# Patient Record
Sex: FEMALE | Race: WHITE | URBAN - METROPOLITAN AREA
[De-identification: names, ages, dates, MRNs, and addresses within clinical notes are randomized per-mention and may not be internally consistent; named-entity substitution may affect disease eponyms.]

---

## 2019-08-15 ENCOUNTER — OUTPATIENT (OUTPATIENT)
Dept: OUTPATIENT SERVICES | Facility: HOSPITAL | Age: 69
LOS: 1 days | Discharge: HOME | End: 2019-08-15
Payer: MEDICARE

## 2019-08-15 DIAGNOSIS — Z12.31 ENCOUNTER FOR SCREENING MAMMOGRAM FOR MALIGNANT NEOPLASM OF BREAST: ICD-10-CM

## 2019-08-15 PROCEDURE — 77067 SCR MAMMO BI INCL CAD: CPT | Mod: 26

## 2019-08-15 PROCEDURE — 77063 BREAST TOMOSYNTHESIS BI: CPT | Mod: 26

## 2019-09-17 ENCOUNTER — OUTPATIENT (OUTPATIENT)
Dept: OUTPATIENT SERVICES | Facility: HOSPITAL | Age: 69
LOS: 1 days | Discharge: HOME | End: 2019-09-17

## 2019-09-17 DIAGNOSIS — R97.8 OTHER ABNORMAL TUMOR MARKERS: ICD-10-CM

## 2019-09-17 DIAGNOSIS — E07.9 DISORDER OF THYROID, UNSPECIFIED: ICD-10-CM

## 2019-09-17 DIAGNOSIS — Z79.899 OTHER LONG TERM (CURRENT) DRUG THERAPY: ICD-10-CM

## 2019-09-17 DIAGNOSIS — Z00.00 ENCOUNTER FOR GENERAL ADULT MEDICAL EXAMINATION WITHOUT ABNORMAL FINDINGS: ICD-10-CM

## 2019-09-17 DIAGNOSIS — R73.09 OTHER ABNORMAL GLUCOSE: ICD-10-CM

## 2019-09-17 DIAGNOSIS — R53.83 OTHER FATIGUE: ICD-10-CM

## 2020-07-30 ENCOUNTER — OUTPATIENT (OUTPATIENT)
Dept: OUTPATIENT SERVICES | Facility: HOSPITAL | Age: 70
LOS: 1 days | Discharge: HOME | End: 2020-07-30
Payer: MEDICARE

## 2020-07-30 DIAGNOSIS — R10.819 ABDOMINAL TENDERNESS, UNSPECIFIED SITE: ICD-10-CM

## 2020-07-30 PROCEDURE — 75571 CT HRT W/O DYE W/CA TEST: CPT | Mod: 26,59

## 2020-07-30 PROCEDURE — 74177 CT ABD & PELVIS W/CONTRAST: CPT | Mod: 26

## 2020-07-30 PROCEDURE — 71260 CT THORAX DX C+: CPT | Mod: 26

## 2022-10-23 ENCOUNTER — NON-APPOINTMENT (OUTPATIENT)
Age: 72
End: 2022-10-23

## 2023-05-12 ENCOUNTER — INPATIENT (INPATIENT)
Facility: HOSPITAL | Age: 73
LOS: 1 days | Discharge: HOME | DRG: 506 | End: 2023-05-14
Attending: ORTHOPAEDIC SURGERY | Admitting: ORTHOPAEDIC SURGERY
Payer: MEDICARE

## 2023-05-12 ENCOUNTER — APPOINTMENT (OUTPATIENT)
Dept: ORTHOPEDIC SURGERY | Facility: CLINIC | Age: 73
End: 2023-05-12
Payer: COMMERCIAL

## 2023-05-12 VITALS
HEIGHT: 64 IN | DIASTOLIC BLOOD PRESSURE: 92 MMHG | TEMPERATURE: 98 F | SYSTOLIC BLOOD PRESSURE: 135 MMHG | RESPIRATION RATE: 20 BRPM | OXYGEN SATURATION: 96 % | WEIGHT: 139.99 LBS | HEART RATE: 107 BPM

## 2023-05-12 DIAGNOSIS — M65.9 SYNOVITIS AND TENOSYNOVITIS, UNSPECIFIED: ICD-10-CM

## 2023-05-12 LAB
ANION GAP SERPL CALC-SCNC: 13 MMOL/L — SIGNIFICANT CHANGE UP (ref 7–14)
BASOPHILS # BLD AUTO: 0.02 K/UL — SIGNIFICANT CHANGE UP (ref 0–0.2)
BASOPHILS NFR BLD AUTO: 0.2 % — SIGNIFICANT CHANGE UP (ref 0–1)
BUN SERPL-MCNC: 15 MG/DL — SIGNIFICANT CHANGE UP (ref 10–20)
CALCIUM SERPL-MCNC: 9.4 MG/DL — SIGNIFICANT CHANGE UP (ref 8.4–10.5)
CHLORIDE SERPL-SCNC: 106 MMOL/L — SIGNIFICANT CHANGE UP (ref 98–110)
CO2 SERPL-SCNC: 23 MMOL/L — SIGNIFICANT CHANGE UP (ref 17–32)
CREAT SERPL-MCNC: 0.7 MG/DL — SIGNIFICANT CHANGE UP (ref 0.7–1.5)
EGFR: 91 ML/MIN/1.73M2 — SIGNIFICANT CHANGE UP
EOSINOPHIL # BLD AUTO: 0.01 K/UL — SIGNIFICANT CHANGE UP (ref 0–0.7)
EOSINOPHIL NFR BLD AUTO: 0.1 % — SIGNIFICANT CHANGE UP (ref 0–8)
ERYTHROCYTE [SEDIMENTATION RATE] IN BLOOD: 26 MM/HR — HIGH (ref 0–20)
GLUCOSE SERPL-MCNC: 123 MG/DL — HIGH (ref 70–99)
HCT VFR BLD CALC: 43.1 % — SIGNIFICANT CHANGE UP (ref 37–47)
HGB BLD-MCNC: 14.6 G/DL — SIGNIFICANT CHANGE UP (ref 12–16)
IMM GRANULOCYTES NFR BLD AUTO: 0.2 % — SIGNIFICANT CHANGE UP (ref 0.1–0.3)
LYMPHOCYTES # BLD AUTO: 0.94 K/UL — LOW (ref 1.2–3.4)
LYMPHOCYTES # BLD AUTO: 8.9 % — LOW (ref 20.5–51.1)
MCHC RBC-ENTMCNC: 31 PG — SIGNIFICANT CHANGE UP (ref 27–31)
MCHC RBC-ENTMCNC: 33.9 G/DL — SIGNIFICANT CHANGE UP (ref 32–37)
MCV RBC AUTO: 91.5 FL — SIGNIFICANT CHANGE UP (ref 81–99)
MONOCYTES # BLD AUTO: 0.64 K/UL — HIGH (ref 0.1–0.6)
MONOCYTES NFR BLD AUTO: 6.1 % — SIGNIFICANT CHANGE UP (ref 1.7–9.3)
NEUTROPHILS # BLD AUTO: 8.89 K/UL — HIGH (ref 1.4–6.5)
NEUTROPHILS NFR BLD AUTO: 84.5 % — HIGH (ref 42.2–75.2)
NRBC # BLD: 0 /100 WBCS — SIGNIFICANT CHANGE UP (ref 0–0)
PLATELET # BLD AUTO: 236 K/UL — SIGNIFICANT CHANGE UP (ref 130–400)
PMV BLD: 10.5 FL — HIGH (ref 7.4–10.4)
POTASSIUM SERPL-MCNC: 3.9 MMOL/L — SIGNIFICANT CHANGE UP (ref 3.5–5)
POTASSIUM SERPL-SCNC: 3.9 MMOL/L — SIGNIFICANT CHANGE UP (ref 3.5–5)
RBC # BLD: 4.71 M/UL — SIGNIFICANT CHANGE UP (ref 4.2–5.4)
RBC # FLD: 12.2 % — SIGNIFICANT CHANGE UP (ref 11.5–14.5)
SODIUM SERPL-SCNC: 142 MMOL/L — SIGNIFICANT CHANGE UP (ref 135–146)
WBC # BLD: 10.52 K/UL — SIGNIFICANT CHANGE UP (ref 4.8–10.8)
WBC # FLD AUTO: 10.52 K/UL — SIGNIFICANT CHANGE UP (ref 4.8–10.8)

## 2023-05-12 PROCEDURE — 73220 MRI UPPR EXTREMITY W/O&W/DYE: CPT | Mod: 26,RT

## 2023-05-12 PROCEDURE — 99204 OFFICE O/P NEW MOD 45 MIN: CPT

## 2023-05-12 PROCEDURE — 99285 EMERGENCY DEPT VISIT HI MDM: CPT | Mod: GC

## 2023-05-12 PROCEDURE — 85027 COMPLETE CBC AUTOMATED: CPT

## 2023-05-12 PROCEDURE — 86803 HEPATITIS C AB TEST: CPT

## 2023-05-12 PROCEDURE — 87205 SMEAR GRAM STAIN: CPT

## 2023-05-12 PROCEDURE — 87070 CULTURE OTHR SPECIMN AEROBIC: CPT

## 2023-05-12 PROCEDURE — 36415 COLL VENOUS BLD VENIPUNCTURE: CPT

## 2023-05-12 PROCEDURE — 85652 RBC SED RATE AUTOMATED: CPT

## 2023-05-12 PROCEDURE — 73220 MRI UPPR EXTREMITY W/O&W/DYE: CPT | Mod: MA,RT

## 2023-05-12 PROCEDURE — 86140 C-REACTIVE PROTEIN: CPT

## 2023-05-12 PROCEDURE — 73130 X-RAY EXAM OF HAND: CPT | Mod: 26,50

## 2023-05-12 PROCEDURE — A9579: CPT

## 2023-05-12 RX ORDER — KETOROLAC TROMETHAMINE 30 MG/ML
15 SYRINGE (ML) INJECTION EVERY 6 HOURS
Refills: 0 | Status: DISCONTINUED | OUTPATIENT
Start: 2023-05-12 | End: 2023-05-13

## 2023-05-12 RX ORDER — IBUPROFEN 200 MG
600 TABLET ORAL ONCE
Refills: 0 | Status: COMPLETED | OUTPATIENT
Start: 2023-05-12 | End: 2023-05-12

## 2023-05-12 RX ORDER — AMPICILLIN SODIUM AND SULBACTAM SODIUM 250; 125 MG/ML; MG/ML
1.5 INJECTION, POWDER, FOR SUSPENSION INTRAMUSCULAR; INTRAVENOUS ONCE
Refills: 0 | Status: COMPLETED | OUTPATIENT
Start: 2023-05-12 | End: 2023-05-12

## 2023-05-12 RX ORDER — POLYETHYLENE GLYCOL 3350 17 G/17G
17 POWDER, FOR SOLUTION ORAL AT BEDTIME
Refills: 0 | Status: DISCONTINUED | OUTPATIENT
Start: 2023-05-12 | End: 2023-05-14

## 2023-05-12 RX ORDER — TETANUS TOXOID, REDUCED DIPHTHERIA TOXOID AND ACELLULAR PERTUSSIS VACCINE, ADSORBED 5; 2.5; 8; 8; 2.5 [IU]/.5ML; [IU]/.5ML; UG/.5ML; UG/.5ML; UG/.5ML
0.5 SUSPENSION INTRAMUSCULAR ONCE
Refills: 0 | Status: COMPLETED | OUTPATIENT
Start: 2023-05-12 | End: 2023-05-12

## 2023-05-12 RX ORDER — MAGNESIUM HYDROXIDE 400 MG/1
30 TABLET, CHEWABLE ORAL DAILY
Refills: 0 | Status: DISCONTINUED | OUTPATIENT
Start: 2023-05-12 | End: 2023-05-14

## 2023-05-12 RX ORDER — PANTOPRAZOLE SODIUM 20 MG/1
40 TABLET, DELAYED RELEASE ORAL
Refills: 0 | Status: DISCONTINUED | OUTPATIENT
Start: 2023-05-12 | End: 2023-05-14

## 2023-05-12 RX ORDER — SENNA PLUS 8.6 MG/1
2 TABLET ORAL AT BEDTIME
Refills: 0 | Status: DISCONTINUED | OUTPATIENT
Start: 2023-05-12 | End: 2023-05-14

## 2023-05-12 RX ORDER — ONDANSETRON 8 MG/1
4 TABLET, FILM COATED ORAL EVERY 6 HOURS
Refills: 0 | Status: DISCONTINUED | OUTPATIENT
Start: 2023-05-12 | End: 2023-05-14

## 2023-05-12 RX ORDER — GABAPENTIN 400 MG/1
300 CAPSULE ORAL
Refills: 0 | Status: DISCONTINUED | OUTPATIENT
Start: 2023-05-12 | End: 2023-05-14

## 2023-05-12 RX ORDER — TRAMADOL HYDROCHLORIDE 50 MG/1
50 TABLET ORAL EVERY 4 HOURS
Refills: 0 | Status: DISCONTINUED | OUTPATIENT
Start: 2023-05-12 | End: 2023-05-14

## 2023-05-12 RX ORDER — AMPICILLIN SODIUM AND SULBACTAM SODIUM 250; 125 MG/ML; MG/ML
3 INJECTION, POWDER, FOR SUSPENSION INTRAMUSCULAR; INTRAVENOUS EVERY 6 HOURS
Refills: 0 | Status: DISCONTINUED | OUTPATIENT
Start: 2023-05-12 | End: 2023-05-14

## 2023-05-12 RX ADMIN — Medication 600 MILLIGRAM(S): at 12:39

## 2023-05-12 RX ADMIN — Medication 15 MILLIGRAM(S): at 17:28

## 2023-05-12 RX ADMIN — AMPICILLIN SODIUM AND SULBACTAM SODIUM 200 GRAM(S): 250; 125 INJECTION, POWDER, FOR SUSPENSION INTRAMUSCULAR; INTRAVENOUS at 17:28

## 2023-05-12 RX ADMIN — Medication 15 MILLIGRAM(S): at 17:38

## 2023-05-12 RX ADMIN — TETANUS TOXOID, REDUCED DIPHTHERIA TOXOID AND ACELLULAR PERTUSSIS VACCINE, ADSORBED 0.5 MILLILITER(S): 5; 2.5; 8; 8; 2.5 SUSPENSION INTRAMUSCULAR at 12:40

## 2023-05-12 RX ADMIN — AMPICILLIN SODIUM AND SULBACTAM SODIUM 200 GRAM(S): 250; 125 INJECTION, POWDER, FOR SUSPENSION INTRAMUSCULAR; INTRAVENOUS at 23:05

## 2023-05-12 RX ADMIN — AMPICILLIN SODIUM AND SULBACTAM SODIUM 200 GRAM(S): 250; 125 INJECTION, POWDER, FOR SUSPENSION INTRAMUSCULAR; INTRAVENOUS at 13:12

## 2023-05-12 RX ADMIN — Medication 15 MILLIGRAM(S): at 23:30

## 2023-05-12 RX ADMIN — Medication 15 MILLIGRAM(S): at 23:05

## 2023-05-12 RX ADMIN — GABAPENTIN 300 MILLIGRAM(S): 400 CAPSULE ORAL at 21:27

## 2023-05-12 NOTE — ED PROVIDER NOTE - ATTENDING CONTRIBUTION TO CARE
To sedate her because nurses do not feel comfortable getting the labs  72-year-old female past medical history of osteoarthritis hyperlipidemia presents status post cat bite.  Patient states her cat who is vaccinated was trying to go outside and she brought the cat back in when it bit and scratched both of her hands 2 days ago.  Patient developed redness swelling pain especially to right fourth digit PIP.  Patient took 1 dose of Augmentin with no relief today.  Patient seen by orthopedic hand surgeon Dr. Tse today and sent to ER.  Unknown last Tdap.  Patient also states her wedding band on left fourth digit is stuck.    On exam, AFVSS, Well appearing, No acute distress, NCAT, EOMI, PERRLA, MMM, Neck supple, LCTAB, RRR nl s1s2 No mrg, Abdomen Soft NTND, AAOx3, No Focal Deficits, No LE edema or calf TTP, right fourth digit erythematous swollen sausagelike tender to palpation especially at DIP with decreased range of motion held in flexion, pain with passive extension, sensation intact light touch, multiple scratches to bilateral hands, erythema on the palmar aspect of the right fourth PIP, 2+ radial pulse,    A/P; wedding band removed from left fourth digit, right fourth digit concern for wound infection/cellulitis, rule out flexor tenosynovitis/septic arthritis/abscess, will do labs x-ray Tdap IV antibiotics hand surgery consult needs admission

## 2023-05-12 NOTE — ED PROVIDER NOTE - PROGRESS NOTE DETAILS
Resident AO: Wedding band set removed, and handed to patient. X-ray, labs, antibiotics/tetanus ordered. Hand Surgery consulted.

## 2023-05-12 NOTE — H&P ADULT - ASSESSMENT
owner catscratch ad bite 2 days pta   r/o right rf fts with deep space infection   unasyn  mri   betadine soaks   possible I&D

## 2023-05-12 NOTE — ED ADULT NURSE NOTE - NSFALLUNIVINTERV_ED_ALL_ED
Bed/Stretcher in lowest position, wheels locked, appropriate side rails in place/Call bell, personal items and telephone in reach/Instruct patient to call for assistance before getting out of bed/chair/stretcher/Non-slip footwear applied when patient is off stretcher/[place] to call system/Physically safe environment - no spills, clutter or unnecessary equipment/Purposeful proactive rounding/Room/bathroom lighting operational, light cord in reach

## 2023-05-12 NOTE — CONSULT NOTE ADULT - SUBJECTIVE AND OBJECTIVE BOX
Patient is a 72-year-old woman with a history of arthritis, dyslipidemia, presenting status post animal bites.  Patient was handling her own kitten [who is fully vaccinated (including for rabies), and is indoors only] 2 days ago, when it bit and scratched both of her hands.  Patient then slowly developed worsening swelling of both of her hands, specifically fourth digit of the right hand.  Patient took 1 dose of Augmentin yesterday evening, and was advised to come to the ED today.  No fever.  The right fourth digit is erythematous and swollen, and held in slight flexion, unable to fully flex or extend secondary to pain.   Dr Tse saw  the pt in the office today and recommended the pt be admmitted for IVabx , soaks and an MRI w&w/o contrast to evaluate for deep space infection and fts  the ID team has been requested to see the pt for abx recommendations.     Patient is a 72-year-old woman with a history of arthritis, dyslipidemia, presenting status post animal bites.  Patient was handling her own kitten [who is fully vaccinated (including for rabies), and is indoors only] 2 days ago, when it bit and scratched both of her hands.  Patient then slowly developed worsening swelling of both of her hands, specifically fourth digit of the right hand.  Patient took 1 dose of Augmentin yesterday evening, and was advised to come to the ED today.  No fever.  The right RING FINGER  is erythematous and swollen, and held in slight flexion, unable to fully flex or extend secondary to pain.   Dr Tse saw  the pt in the office today and recommended the pt be admmitted for IVabx , soaks and an MRI w&w/o contrast to evaluate for deep space infection and fts  the ID team has been requested to see the pt for abx recommendations.     Patient is a 72-year-old woman with a history of arthritis, dyslipidemia, presenting status post animal bites.  Patient was handling her own kitten [who is fully vaccinated (including for rabies), and is indoors only] 2 days ago, when it bit and scratched both of her hands.  Patient then slowly developed worsening swelling of both of her hands, specifically fourth digit of the right hand.  Patient took 1 dose of Augmentin yesterday evening, and was advised to come to the ED today.  No fever.  The right RING FINGER  is erythematous and swollen, and held in slight flexion, unable to fully flex or extend secondary to pain.   Dr Tse saw  the pt in the office today and recommended the pt be admmitted for IVabx , soaks and an MRI w&w/o contrast to evaluate for deep space infection and fts  left hand wedding ring removed mild tender good active rom   right hand ring finger fusiform sts, tender over the flexor tendon , decreased rom   mild snow proximal tracking     a/p   r/o   left rf fts   r/o deep space infection    Unasyn 3g q6h IV , warm soaks encourage rom while in the betadine soaks 5x day   the ID team RECOMMENDATIONS  - MRI  - Hand surgery  - Unasyn 3g q6h IV , warm soaks encourage rom while in the betadine soaks  final regimen pending MRI results   - ESR, CRP

## 2023-05-12 NOTE — CONSULT NOTE ADULT - SUBJECTIVE AND OBJECTIVE BOX
BENTLEY PACE  72y, Female  Allergy: Allergy Status Unknown      CHIEF COMPLAINT:       LOS      HPI  73 yo F presenting after cat bite. It was her indoor vaccinated cat that tried to run outside. She reports multiple bites/scratches. Started PO augmentin last night.     Currently ordered for: s/p unasyn x1       PMH  PAST MEDICAL & SURGICAL HISTORY:  as noted above     FAMILY HISTORY  non-contributory     SOCIAL HISTORY  Social History:      ROS  General: Denies rigors, nightsweats  HEENT: Denies headache, rhinorrhea, sore throat, eye pain  CV: Denies CP, palpitations  PULM: Denies wheezing, hemoptysis  GI: Denies hematemesis, hematochezia, melena  : Denies discharge, hematuria  MSK: Denies arthralgias, myalgias  SKIN: as noted above   NEURO: Denies paresthesias, weakness  PSYCH: Denies depression, anxiety     VITALS:  T(F): 98.2, Max: 98.2 (05-12-23 @ 11:13)  HR: 107  BP: 135/92  RR: 20Vital Signs Last 24 Hrs  T(C): 36.8 (12 May 2023 11:13), Max: 36.8 (12 May 2023 11:13)  T(F): 98.2 (12 May 2023 11:13), Max: 98.2 (12 May 2023 11:13)  HR: 107 (12 May 2023 11:13) (107 - 107)  BP: 135/92 (12 May 2023 11:13) (135/92 - 135/92)  BP(mean): --  RR: 20 (12 May 2023 11:13) (20 - 20)  SpO2: 96% (12 May 2023 11:13) (96% - 96%)    Parameters below as of 12 May 2023 11:13  Patient On (Oxygen Delivery Method): room air        PHYSICAL EXAM:  Gen: NAD, resting in bed  HEENT: Normocephalic, atraumatic  Neck: supple, no lymphadenopathy  CV: Regular rate & regular rhythm  Lungs: decreased BS at bases, no fremitus  Abdomen: Soft, BS present  Ext: Warm, well perfused  Neuro: non focal, awake  Skin: L hand 4th digit edema, R hand edema, decreased ROM, cannot make a full fist, bite marks- multiple, edema of the R 4th finger, held in slight flexion, erythema, no lymphangitis   Lines: no phlebitis     TESTS & MEASUREMENTS:                        14.6   10.52 )-----------( 236      ( 12 May 2023 12:49 )             43.1     05-12    142  |  106  |  15  ----------------------------<  123<H>  3.9   |  23  |  0.7    Ca    9.4      12 May 2023 12:49                    INFECTIOUS DISEASES TESTING      INFLAMMATORY MARKERS      RADIOLOGY & ADDITIONAL TESTS:  I have personally reviewed the last Chest xray  CXR      CT      CARDIOLOGY TESTING      MEDICATIONS        ANTIBIOTICS:      ALLERGIES:  Allergy Status Unknown

## 2023-05-12 NOTE — ED PROVIDER NOTE - PHYSICAL EXAMINATION
Received cosign on ambulance request.  CM putting in call for authorization from [facility], [phone].  Awaiting call back.    1140 am Received call back from [facility], Authorization number is [id number].  Will put in for ambulance through [facility].  Request from floor for ambulance to notify nurse when ambulance enroute.  Nursing station number given.   _  CONSTITUTIONAL: NAD  SKIN: Warm, dry; +abrasions and puncture wounds to bilateral hands  HEAD: NCAT  EYES: Clear conjunctiva   ENT: MMM  NECK: Supple  CARD: No JVD, no cyanosis  RESP: Speaking in full sentences; symmetric rise and fall of chest  ABD: S/NT, no R/G  NEUROMSK: +RUE with erythema and swelling of the dorsum of hand and 4th digit, said digit held in mild flexion with difficulty fully extending/flexing; +LUE with mildly swollen 4th digit with wedding band set in place; B/L radial pulse 2+; motor and sensation intact B/L  PSYCH: Cooperative, appropriate

## 2023-05-12 NOTE — ED PROVIDER NOTE - OBJECTIVE STATEMENT
Patient is a 72-year-old woman with a history of arthritis, dyslipidemia, presenting status post animal bites.  Patient was handling her own kitten [who is fully vaccinated (including for rabies), and is indoors only] 2 days ago, when it bit and scratched both of her hands.  Patient then slowly developed worsening swelling of both of her hands, specifically fourth digit of the right hand.  Patient took 1 dose of Augmentin yesterday evening, and was advised to come to the ED today.  No fever.  The right fourth digit is erythematous and swollen, and held in slight flexion, unable to fully flex or extend secondary to pain. Patient has her wedding band side on the fourth digit of the left hand, which is slightly swollen.  Unsure of last tetanus vaccination.

## 2023-05-12 NOTE — H&P ADULT - HISTORY OF PRESENT ILLNESS
73 yo female incurred cat scratches' and bites to both hands 2 days ago the right rf is  suspicious for fts oral augmenten was started as an out pt for a dose before the pt arrived to the ed   pmhx  dld   mild djd  pshx   3csections   allergies denied   illicit substances denied   tobacco denied   Vital Signs Last 24 Hrs  T(C): 36.8 (12 May 2023 11:13), Max: 36.8 (12 May 2023 11:13)  T(F): 98.2 (12 May 2023 11:13), Max: 98.2 (12 May 2023 11:13)  HR: 107 (12 May 2023 11:13) (107 - 107)  BP: 135/92 (12 May 2023 11:13) (135/92 - 135/92)  BP(mean): --  RR: 20 (12 May 2023 11:13) (20 - 20)  SpO2: 96% (12 May 2023 11:13) (96% - 96%)    Parameters below as of 12 May 2023 11:13  Patient On (Oxygen Delivery Method): room air                        14.6   10.52 )-----------( 236      ( 12 May 2023 12:49 )             43.1     05-12    142  |  106  |  15  ----------------------------<  123<H>  3.9   |  23  |  0.7    Ca    9.4      12 May 2023 12:49    physical the pts left hand has a few scratches lrf mild sts with good arom  right hand a few scratches with fusiform sts of the rf with ascending cellulitis, decreased arom  tenderness over the ft

## 2023-05-12 NOTE — ED ADULT NURSE REASSESSMENT NOTE - NS ED NURSE REASSESS COMMENT FT1
Patient transported to MRI and then to floor . Patient provided a gown to be changed in the changing room in MRI ,bathroom was not available now

## 2023-05-12 NOTE — CONSULT NOTE ADULT - ASSESSMENT
ASSESSMENT  73 yo F presenting after cat bite. It was her indoor vaccinated cat that tried to run outside. She reports multiple bites/scratches. Started PO augmentin last night.     IMPRESSION  #Cat bite, infected, rule out tenosynovitis  < from: Xray Hand 3 Views, Bilateral (05.12.23 @ 12:34) >  No acute displaced fracture dislocation.  Degenerative change, right greater than left.  Osteopenia  #Sepsis ruled out on admission   #Abx allergy: Allergy Status Unknown    Creatinine, Serum: 0.7 (05-12-23 @ 12:49)    Weight (kg): 63.5 (05-12-23 @ 11:13)    RECOMMENDATIONS  - MRI  - Hand surgery  - Unasyn 3g q6h IV , final regimen pending MRI results   - ESR, CRP     If any questions, please send a message or call on [facility] Teams  Please continue to update ID with any pertinent new laboratory or radiographic findings  #[id number]

## 2023-05-12 NOTE — ED PROCEDURE NOTE - CPROC ED PATIENT POSITION1
sitting PMI and heart sounds localize heart on left side of chest/Murmurs absent/Pulse with normal variation, frequency and intensity (amplitude & strength) with equal intensity on upper and lower extremities/Blood pressure value(s) are adequate

## 2023-05-12 NOTE — ASSESSMENT
[FreeTextEntry1] : Patient comes in with a couple days old of a cat bite on the right side.  She started Augmentin yesterday.  She says the pain has been getting worse.  She has been working on range of motion.  The cat was hers.  She has pain of the ring finger.\par \par Right hand: Multiple cat bites throughout the hand, notably on the ulnar aspect of the ring finger dorsal aspect of the ring finger MP joint dorsal aspect of the hand and wrist, erythema along the volar aspect of the ring finger, dorsal aspect of the hands, decreased range of motion of the fingers especially the ring finger, decreased range of motion of the MP joint with mild pain, no pain with range of motion of MP joint, mildly tender palpation along flexor tendon sheath\par \par Patient had a cat bite to the right hand.  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  She never can fully straighten it.  I discussed with the patient that I believe she should have IV antibiotics.  She was sent to the emergency department to get IV Unasyn.  She will then restart her Augmentin and also start Bactrim when she leaves the ED.  Her erythema was marked on the hand in hopes that it improves.  If the pain does not improve there is a chance we may need to consider surgical intervention.

## 2023-05-13 LAB
CRP SERPL-MCNC: 35.4 MG/L — HIGH
ERYTHROCYTE [SEDIMENTATION RATE] IN BLOOD: 22 MM/HR — HIGH (ref 0–20)
HCT VFR BLD CALC: 38 % — SIGNIFICANT CHANGE UP (ref 37–47)
HCV AB S/CO SERPL IA: 0.03 COI — SIGNIFICANT CHANGE UP
HCV AB SERPL-IMP: SIGNIFICANT CHANGE UP
HGB BLD-MCNC: 12.6 G/DL — SIGNIFICANT CHANGE UP (ref 12–16)
MCHC RBC-ENTMCNC: 31.1 PG — HIGH (ref 27–31)
MCHC RBC-ENTMCNC: 33.2 G/DL — SIGNIFICANT CHANGE UP (ref 32–37)
MCV RBC AUTO: 93.8 FL — SIGNIFICANT CHANGE UP (ref 81–99)
NRBC # BLD: 0 /100 WBCS — SIGNIFICANT CHANGE UP (ref 0–0)
PLATELET # BLD AUTO: 186 K/UL — SIGNIFICANT CHANGE UP (ref 130–400)
PMV BLD: 10.7 FL — HIGH (ref 7.4–10.4)
RBC # BLD: 4.05 M/UL — LOW (ref 4.2–5.4)
RBC # FLD: 12.4 % — SIGNIFICANT CHANGE UP (ref 11.5–14.5)
WBC # BLD: 8.99 K/UL — SIGNIFICANT CHANGE UP (ref 4.8–10.8)
WBC # FLD AUTO: 8.99 K/UL — SIGNIFICANT CHANGE UP (ref 4.8–10.8)

## 2023-05-13 PROCEDURE — 26010 DRAINAGE OF FINGER ABSCESS: CPT

## 2023-05-13 PROCEDURE — 99231 SBSQ HOSP IP/OBS SF/LOW 25: CPT | Mod: 25,GC

## 2023-05-13 RX ORDER — LIDOCAINE HCL 20 MG/ML
20 VIAL (ML) INJECTION ONCE
Refills: 0 | Status: DISCONTINUED | OUTPATIENT
Start: 2023-05-13 | End: 2023-05-14

## 2023-05-13 RX ADMIN — Medication 15 MILLIGRAM(S): at 05:24

## 2023-05-13 RX ADMIN — AMPICILLIN SODIUM AND SULBACTAM SODIUM 200 GRAM(S): 250; 125 INJECTION, POWDER, FOR SUSPENSION INTRAMUSCULAR; INTRAVENOUS at 11:53

## 2023-05-13 RX ADMIN — GABAPENTIN 300 MILLIGRAM(S): 400 CAPSULE ORAL at 17:34

## 2023-05-13 RX ADMIN — AMPICILLIN SODIUM AND SULBACTAM SODIUM 200 GRAM(S): 250; 125 INJECTION, POWDER, FOR SUSPENSION INTRAMUSCULAR; INTRAVENOUS at 23:31

## 2023-05-13 RX ADMIN — AMPICILLIN SODIUM AND SULBACTAM SODIUM 200 GRAM(S): 250; 125 INJECTION, POWDER, FOR SUSPENSION INTRAMUSCULAR; INTRAVENOUS at 05:23

## 2023-05-13 RX ADMIN — Medication 15 MILLIGRAM(S): at 11:54

## 2023-05-13 RX ADMIN — Medication 15 MILLIGRAM(S): at 05:52

## 2023-05-13 RX ADMIN — GABAPENTIN 300 MILLIGRAM(S): 400 CAPSULE ORAL at 05:23

## 2023-05-13 RX ADMIN — Medication 1 TABLET(S): at 11:54

## 2023-05-13 RX ADMIN — PANTOPRAZOLE SODIUM 40 MILLIGRAM(S): 20 TABLET, DELAYED RELEASE ORAL at 05:23

## 2023-05-13 RX ADMIN — AMPICILLIN SODIUM AND SULBACTAM SODIUM 200 GRAM(S): 250; 125 INJECTION, POWDER, FOR SUSPENSION INTRAMUSCULAR; INTRAVENOUS at 17:34

## 2023-05-13 NOTE — PROGRESS NOTE ADULT - TIME-BASED BILLING (NON-CRITICAL CARE)
Rohan Ventura is requesting a refill of:    Pending Prescriptions:                       Disp   Refills    simvastatin (ZOCOR) 20 MG tablet          90 tab*1            Sig: Take 1 tablet (20 mg) by mouth At Bedtime    Recent Labs   Lab Test  02/13/17   1842  02/08/16   1636   CHOL  151  178   HDL  34*  35*   LDL  92  113   TRIG  127  151*   CHOLHDLRATIO  4.4  5.1*     Please close encounter if RX was sent. Thanks, Skylar    
Time-based billing (NON-critical care)

## 2023-05-13 NOTE — PROGRESS NOTE ADULT - ASSESSMENT
73 yo F presenting after cat bite. It was her indoor vaccinated cat that tried to run outside. She reports multiple bites/scratches. Started PO augmentin last night.     IMPRESSION  #Cat bite, infected, rule out tenosynovitis  < from: Xray Hand 3 Views, Bilateral (05.12.23 @ 12:34) >  No acute displaced fracture dislocation.  MRI with 10 x 5 x 4 mm subcutaneous abscess dorsal to the ring finger PIP joint. Effusion and synovitis of the PIP joint which raises concern for septic   arthritis. Mild tenosynovitis in the ring finger. No evidence of osteomyelitis.  ESR 22, CRP 35.4  Degenerative change, right greater than left.  Osteopenia  #Sepsis ruled out on admission   #Abx allergy: Allergy Status Unknown    Creatinine, Serum: 0.7 (05-12-23 @ 12:49)    Weight (kg): 63.5 (05-12-23 @ 11:13)    RECOMMENDATIONS  - Hand surgery  - Unasyn 3g q6h IV.  Need C&S if any drainage attempted

## 2023-05-13 NOTE — PROGRESS NOTE ADULT - ATTENDING COMMENTS
seen and examined  fluctuance over dorsal pip joint  I and D performed bedside  under sterile conditions, field block performed, 1c longitudinal incision made over pip jt, bluntly dissected, expressed 0.5cc pus, packed with iodiform  cultures sent  tolerated well

## 2023-05-13 NOTE — PROGRESS NOTE ADULT - SUBJECTIVE AND OBJECTIVE BOX
SUBJECTIVE: Patient seen and examined. Pain controlled.  Pt did well o/n  No f/c/n/v/cp/sob. RF improving since admission.      OBJECTIVE:  NAD  Vital Signs Last 24 Hrs  T(C): 37.3 (13 May 2023 04:30), Max: 37.3 (13 May 2023 00:34)  T(F): 99.2 (13 May 2023 04:30), Max: 99.2 (13 May 2023 04:30)  HR: 76 (13 May 2023 04:30) (76 - 107)  BP: 129/60 (13 May 2023 04:30) (118/58 - 147/67)  BP(mean): --  RR: 18 (13 May 2023 04:30) (18 - 20)  SpO2: 97% (13 May 2023 04:30) (95% - 97%)    Parameters below as of 13 May 2023 04:30  Patient On (Oxygen Delivery Method): room air        Affected extremity:   RUE  RF swollen and red  Wound on dorsal PIP  No pain along tend sheath  No fusiform swelling  No pain with passive extension  Compartments soft and compressible, no pain w/ passive stretch of digits  SILT Ax/LABCN/R/M/U  AIN/PIN/U intact   Firing deltoid/biceps/triceps/wf/we/epl/fpl/fdp/io   Radial pulse 2+, cap refill <2                          14.6   10.52 )-----------( 236      ( 12 May 2023 12:49 )             43.1     05-12    142  |  106  |  15  ----------------------------<  123<H>  3.9   |  23  |  0.7    Ca    9.4      12 May 2023 12:49      A/P :  Pt is a 71yo Female with right RF cat scratch 3 days ago, admitted for abx and monitoring.  Low concern for FTS at this time.      -    Pain control  -    C/W betadine soaks   -    C/W abx  -    Ortho to monitor
BENTLEY PACE  72y, Female  Allergy: Allergy Status Unknown      CHIEF COMPLAINT: cat bite right hand infection (13 May 2023 07:31)      INTERVAL EVENTS/HPI  - No acute events overnight  - T(F): , Max: 99.2 (05-13-23 @ 04:30)  - Denies any worsening symptoms  - Tolerating medication  - WBC Count: 8.99 K/uL (05-13-23 @ 06:05)      ROS  General: Denies fevers, chills, nightsweats, weight loss  HEENT: Denies headache, rhinorrhea, sore throat, eye pain  CV: Denies CP, palpitations  PULM: Denies SOB, cough  GI: Denies abdominal pain, diarrhea  : Denies dysuria, hematuria  MSK: Denies arthralgias  SKIN: Denies rash   NEURO: Denies paresthesias, weakness  PSYCH: Denies depression    FH non-contributory   Social Hx non-contributory    VITALS:  T(F): 97.6, Max: 99.2 (05-13-23 @ 04:30)  HR: 73  BP: 123/58  RR: 18Vital Signs Last 24 Hrs  T(C): 36.4 (13 May 2023 09:15), Max: 37.3 (13 May 2023 00:34)  T(F): 97.6 (13 May 2023 09:15), Max: 99.2 (13 May 2023 04:30)  HR: 73 (13 May 2023 09:15) (73 - 86)  BP: 123/58 (13 May 2023 09:15) (118/58 - 147/67)  BP(mean): --  RR: 18 (13 May 2023 09:15) (18 - 18)  SpO2: 96% (13 May 2023 09:15) (95% - 97%)    Parameters below as of 13 May 2023 04:30  Patient On (Oxygen Delivery Method): room air        PHYSICAL EXAM:  Gen: NAD, resting in bed  HEENT: Normocephalic, atraumatic  Neck: supple, no lymphadenopathy  CV: Regular rate & regular rhythm  Lungs: decreased BS at bases, no fremitus  Abdomen: Soft, BS present  Ext: Warm, well perfused  Neuro: non focal, awake  Skin: L hand 4th digit edema, R hand edema, decreased ROM, cannot make a full fist, bite marks- multiple, edema of the R 4th finger, held in slight flexion, erythema      TESTS & MEASUREMENTS:                        12.6   8.99  )-----------( 186      ( 13 May 2023 06:05 )             38.0     05-12    142  |  106  |  15  ----------------------------<  123<H>  3.9   |  23  |  0.7    Ca    9.4      12 May 2023 12:49                    INFECTIOUS DISEASES TESTING      RADIOLOGY & ADDITIONAL TESTS:  I have personally reviewed the last Chest xray  CXR      CT      CARDIOLOGY TESTING      MEDICATIONS  ampicillin/sulbactam  IVPB 3  gabapentin 300  ketorolac   Injectable 15  multivitamin 1  pantoprazole    Tablet 40  polyethylene glycol 3350 17  senna 2      ANTIBIOTICS:  ampicillin/sulbactam  IVPB 3 Gram(s) IV Intermittent every 6 hours      All available historical data has been reviewed

## 2023-05-14 ENCOUNTER — TRANSCRIPTION ENCOUNTER (OUTPATIENT)
Age: 73
End: 2023-05-14

## 2023-05-14 VITALS
TEMPERATURE: 97 F | DIASTOLIC BLOOD PRESSURE: 65 MMHG | OXYGEN SATURATION: 96 % | HEART RATE: 78 BPM | RESPIRATION RATE: 18 BRPM | SYSTOLIC BLOOD PRESSURE: 141 MMHG

## 2023-05-14 RX ORDER — AMPICILLIN SODIUM AND SULBACTAM SODIUM 250; 125 MG/ML; MG/ML
3 INJECTION, POWDER, FOR SUSPENSION INTRAMUSCULAR; INTRAVENOUS ONCE
Refills: 0 | Status: COMPLETED | OUTPATIENT
Start: 2023-05-14 | End: 2023-05-14

## 2023-05-14 RX ORDER — AMPICILLIN SODIUM AND SULBACTAM SODIUM 250; 125 MG/ML; MG/ML
3 INJECTION, POWDER, FOR SUSPENSION INTRAMUSCULAR; INTRAVENOUS EVERY 6 HOURS
Refills: 0 | Status: DISCONTINUED | OUTPATIENT
Start: 2023-05-14 | End: 2023-05-14

## 2023-05-14 RX ORDER — AMPICILLIN SODIUM AND SULBACTAM SODIUM 250; 125 MG/ML; MG/ML
INJECTION, POWDER, FOR SUSPENSION INTRAMUSCULAR; INTRAVENOUS
Refills: 0 | Status: DISCONTINUED | OUTPATIENT
Start: 2023-05-14 | End: 2023-05-14

## 2023-05-14 RX ADMIN — AMPICILLIN SODIUM AND SULBACTAM SODIUM 200 GRAM(S): 250; 125 INJECTION, POWDER, FOR SUSPENSION INTRAMUSCULAR; INTRAVENOUS at 05:51

## 2023-05-14 RX ADMIN — PANTOPRAZOLE SODIUM 40 MILLIGRAM(S): 20 TABLET, DELAYED RELEASE ORAL at 05:54

## 2023-05-14 RX ADMIN — GABAPENTIN 300 MILLIGRAM(S): 400 CAPSULE ORAL at 05:50

## 2023-05-14 RX ADMIN — AMPICILLIN SODIUM AND SULBACTAM SODIUM 200 GRAM(S): 250; 125 INJECTION, POWDER, FOR SUSPENSION INTRAMUSCULAR; INTRAVENOUS at 10:48

## 2023-05-14 NOTE — DISCHARGE NOTE PROVIDER - CARE PROVIDER_API CALL
Karyn Tse)  Orthopaedic Surgery; Surgery of the Hand  [address] [place] [address]  Phone: [phone]  Fax: [phone]  Established Patient  Follow Up Time:

## 2023-05-14 NOTE — DISCHARGE NOTE PROVIDER - NSDCCPCAREPLAN_GEN_ALL_CORE_FT
PRINCIPAL DISCHARGE DIAGNOSIS  Diagnosis: Tenosynovitis  Assessment and Plan of Treatment:       SECONDARY DISCHARGE DIAGNOSES  Diagnosis: Cat bite  Assessment and Plan of Treatment:

## 2023-05-14 NOTE — DISCHARGE NOTE NURSING/CASE MANAGEMENT/SOCIAL WORK - NSDCPEFALRISK_GEN_ALL_CORE
For information on Fall & Injury Prevention, visit: [email or web address] OR  [email or web address] OR  https://www.cdc.gov/steadi/patient.html

## 2023-05-14 NOTE — DISCHARGE NOTE NURSING/CASE MANAGEMENT/SOCIAL WORK - NSDCVIVACCINE_GEN_ALL_CORE_FT
Tdap; 12-May-2023 12:40; Manuelito Crews (RN); [employer]; [id number] (Exp. Date: 04-Mar-2025); IntraMuscular; Deltoid Right.; 0.5 milliLiter(s); VIS (VIS Published: 09-May-2013, VIS Presented: 12-May-2023);

## 2023-05-14 NOTE — DISCHARGE NOTE NURSING/CASE MANAGEMENT/SOCIAL WORK - PATIENT PORTAL LINK FT
You can access the FollowMyHealth Patient Portal offered by [facility] by registering at the following website: [email or web address]. By joining [employer]’s FollowMyHealth portal, you will also be able to view your health information using other applications (apps) compatible with our system.

## 2023-05-14 NOTE — DISCHARGE NOTE PROVIDER - HOSPITAL COURSE
Pt is a 73yo Female with right RF cat scratch 3 days ago, admitted for abx and monitoring.  Low concern for FTS at this time.
yes

## 2023-05-16 ENCOUNTER — APPOINTMENT (OUTPATIENT)
Dept: ORTHOPEDIC SURGERY | Facility: CLINIC | Age: 73
End: 2023-05-16
Payer: MEDICARE

## 2023-05-16 PROCEDURE — 99214 OFFICE O/P EST MOD 30 MIN: CPT

## 2023-05-16 NOTE — ASSESSMENT
[FreeTextEntry1] : Patient comes back for follow-up of her cat bite.  She is doing better than prior.  She was admitted to the hospital for couple of nights.  She also had an I&D.  She is having a little bit more pain at the PIP joint now.  She still has some swelling.  She feels better than prior.\par \par Right hand: Ring finger with swelling by PIP joint, ecchymosis around the finger, erythema around the finger, nontender to palpation along flexor tendon sheath, tender palpation along PIP joint,\par \par The patient is doing a lot better than prior.  She is can continue with Bactrim and Augmentin now.  She will continue doing warm soaks with squeezes.  She states that her pain at the joint is better than it was prior to being in the hospital and she is improving.  She did have some mild pus come from the incision site this morning.  She will continue with the soaks and squeezing.  If she finds that the finger does not improve we will consider an I&D of the PIP joint.  Of note the patient has severe arthritis at the PIP joint prior to this injury.

## 2023-05-17 ENCOUNTER — APPOINTMENT (OUTPATIENT)
Dept: ORTHOPEDIC SURGERY | Facility: CLINIC | Age: 73
End: 2023-05-17
Payer: MEDICARE

## 2023-05-17 PROCEDURE — 99214 OFFICE O/P EST MOD 30 MIN: CPT

## 2023-05-17 NOTE — ASU PATIENT PROFILE, ADULT - NSALCOHOLFREQ_GEN_A_CORE_SD
Detail Level: Zone Initiate Treatment: Apply 2 pumps Skin Medica Retinol 0.25% to full face at bedtime Plan: Discussed cosmetic removal monthly or less

## 2023-05-17 NOTE — ASU PATIENT PROFILE, ADULT - FALL HARM RISK - UNIVERSAL INTERVENTIONS
Bed in lowest position, wheels locked, appropriate side rails in place/Call bell, personal items and telephone in reach/Instruct patient to call for assistance before getting out of bed or chair/Non-slip footwear when patient is out of bed/[place] to call system/Physically safe environment - no spills, clutter or unnecessary equipment/Purposeful Proactive Rounding/Room/bathroom lighting operational, light cord in reach

## 2023-05-17 NOTE — ASSESSMENT
[FreeTextEntry1] : Patient comes back for follow-up of her cat bite.  She is still having some pain in the finger.  She is having some drainage.  She does not have other complaints.\par \par Right hand: Ring finger with swelling by PIP joint, ecchymosis around the finger, erythema around the finger, nontender to palpation along flexor tendon sheath, tender palpation along PIP joint,\par \par Patient is still having pain at the PIP joint.  While she does have arthritis her MRI show that there is some fluid along the PIP joint.  I believe it is best that we do a formal I&D in the operating room.  This can be done as an outpatient procedure.  Discussed everything thoroughly with the patient as well as her family members.  They are in agreement and would like to move forward surgical intervention.  She will be booked for surgery as soon as possible.

## 2023-05-18 ENCOUNTER — TRANSCRIPTION ENCOUNTER (OUTPATIENT)
Age: 73
End: 2023-05-18

## 2023-05-18 ENCOUNTER — APPOINTMENT (OUTPATIENT)
Dept: ORTHOPEDIC SURGERY | Facility: AMBULATORY SURGERY CENTER | Age: 73
End: 2023-05-18

## 2023-05-18 ENCOUNTER — OUTPATIENT (OUTPATIENT)
Dept: INPATIENT UNIT | Facility: HOSPITAL | Age: 73
LOS: 1 days | Discharge: HOME | End: 2023-05-18
Payer: MEDICARE

## 2023-05-18 VITALS
RESPIRATION RATE: 18 BRPM | HEART RATE: 78 BPM | OXYGEN SATURATION: 98 % | SYSTOLIC BLOOD PRESSURE: 130 MMHG | HEIGHT: 64 IN | WEIGHT: 139.99 LBS | TEMPERATURE: 98 F | DIASTOLIC BLOOD PRESSURE: 61 MMHG

## 2023-05-18 VITALS
HEART RATE: 70 BPM | OXYGEN SATURATION: 98 % | RESPIRATION RATE: 20 BRPM | TEMPERATURE: 98 F | DIASTOLIC BLOOD PRESSURE: 71 MMHG | SYSTOLIC BLOOD PRESSURE: 154 MMHG

## 2023-05-18 DIAGNOSIS — Z98.891 HISTORY OF UTERINE SCAR FROM PREVIOUS SURGERY: Chronic | ICD-10-CM

## 2023-05-18 DIAGNOSIS — S61.451A OPEN BITE OF RIGHT HAND, INITIAL ENCOUNTER: ICD-10-CM

## 2023-05-18 LAB
CULTURE RESULTS: SIGNIFICANT CHANGE UP
CULTURE RESULTS: SIGNIFICANT CHANGE UP
GRAM STN FLD: SIGNIFICANT CHANGE UP
SPECIMEN SOURCE: SIGNIFICANT CHANGE UP

## 2023-05-18 PROCEDURE — 87075 CULTR BACTERIA EXCEPT BLOOD: CPT

## 2023-05-18 PROCEDURE — 26080 EXPLORE/TREAT FINGER JOINT: CPT | Mod: F6

## 2023-05-18 PROCEDURE — 87070 CULTURE OTHR SPECIMN AEROBIC: CPT

## 2023-05-18 PROCEDURE — 87102 FUNGUS ISOLATION CULTURE: CPT

## 2023-05-18 PROCEDURE — 87015 SPECIMEN INFECT AGNT CONCNTJ: CPT

## 2023-05-18 PROCEDURE — 87116 MYCOBACTERIA CULTURE: CPT

## 2023-05-18 PROCEDURE — 87206 SMEAR FLUORESCENT/ACID STAI: CPT

## 2023-05-18 RX ORDER — ACETAMINOPHEN 500 MG
1000 TABLET ORAL ONCE
Refills: 0 | Status: COMPLETED | OUTPATIENT
Start: 2023-05-18 | End: 2023-05-18

## 2023-05-18 RX ORDER — MORPHINE SULFATE 50 MG/1
2 CAPSULE, EXTENDED RELEASE ORAL
Refills: 0 | Status: DISCONTINUED | OUTPATIENT
Start: 2023-05-18 | End: 2023-05-18

## 2023-05-18 RX ORDER — ACETAMINOPHEN 500 MG
650 TABLET ORAL ONCE
Refills: 0 | Status: DISCONTINUED | OUTPATIENT
Start: 2023-05-18 | End: 2023-05-18

## 2023-05-18 RX ORDER — SODIUM CHLORIDE 9 MG/ML
1000 INJECTION, SOLUTION INTRAVENOUS
Refills: 0 | Status: DISCONTINUED | OUTPATIENT
Start: 2023-05-18 | End: 2023-05-18

## 2023-05-18 RX ADMIN — Medication 400 MILLIGRAM(S): at 08:23

## 2023-05-18 RX ADMIN — SODIUM CHLORIDE 100 MILLILITER(S): 9 INJECTION, SOLUTION INTRAVENOUS at 08:29

## 2023-05-18 NOTE — CHART NOTE - NSCHARTNOTEFT_GEN_A_CORE
PACU ANESTHESIA ADMISSION NOTE      Procedure:   Post op diagnosis:      ____  Intubated  TV:______       Rate: ______      FiO2: ______    _x___  Patent Airway    __x__  Full return of protective reflexes    __x__  Full recovery from anesthesia / back to baseline     Vitals:   T:      98     R:        12          BP:   122/87               Sat:      99             P: 78      Mental Status:  __x__ Awake   _____ Alert   _____ Drowsy   _____ Sedated    Nausea/Vomiting:  _x___ NO  ______Yes,   See Post - Op Orders          Pain Scale (0-10):  _____    Treatment: ___x_ None    ____ See Post - Op/PCA Orders    Post - Operative Fluids:   ____ Oral   ____ See Post - Op Orders    Plan: Discharge:   __x__Home       _____Floor     _____Critical Care    _____  Other:_________________    Comments: Procedure completed without anesthetic complication, discharge when criteria met.

## 2023-05-18 NOTE — ASU DISCHARGE PLAN (ADULT/PEDIATRIC) - DISCHARGE TO
Home Griseofulvin Pregnancy And Lactation Text: This medication is Pregnancy Category X and is known to cause serious birth defects. It is unknown if this medication is excreted in breast milk but breast feeding should be avoided.

## 2023-05-19 LAB
NIGHT BLUE STAIN TISS: SIGNIFICANT CHANGE UP
NIGHT BLUE STAIN TISS: SIGNIFICANT CHANGE UP
SPECIMEN SOURCE: SIGNIFICANT CHANGE UP
SPECIMEN SOURCE: SIGNIFICANT CHANGE UP

## 2023-05-22 ENCOUNTER — APPOINTMENT (OUTPATIENT)
Dept: ORTHOPEDIC SURGERY | Facility: CLINIC | Age: 73
End: 2023-05-22
Payer: MEDICARE

## 2023-05-22 DIAGNOSIS — E78.5 HYPERLIPIDEMIA, UNSPECIFIED: ICD-10-CM
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
## 2023-05-22 DIAGNOSIS — M65.841 OTHER SYNOVITIS AND TENOSYNOVITIS, RIGHT HAND: ICD-10-CM
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
## 2023-05-22 DIAGNOSIS — M19.90 UNSPECIFIED OSTEOARTHRITIS, UNSPECIFIED SITE: ICD-10-CM

## 2023-05-22 PROBLEM — E78.00 PURE HYPERCHOLESTEROLEMIA, UNSPECIFIED: Chronic | Status: ACTIVE | Noted: 2023-05-18

## 2023-05-22 PROCEDURE — 99024 POSTOP FOLLOW-UP VISIT: CPT

## 2023-05-22 NOTE — ASSESSMENT
[FreeTextEntry1] : Patient comes in for her post op. She is doing well. She is currently on antibiotics. She states her right small finger feels stiff. \par \par Right ring finger: Incision site clean and intact, sutures in place, decreased range of motion of finger, minimal erythema, neurovascular intact\par \par The patient is doing well. She will work on range of motion. She will follow up next week for suture removal.  She is getting continue with the antibiotics.  She does not need to do any soaks.  If she is not getting the preoperative range of motion from her arthritis, I will send her to therapy

## 2023-05-23 DIAGNOSIS — M00.9 PYOGENIC ARTHRITIS, UNSPECIFIED: ICD-10-CM

## 2023-05-31 ENCOUNTER — APPOINTMENT (OUTPATIENT)
Dept: ORTHOPEDIC SURGERY | Facility: CLINIC | Age: 73
End: 2023-05-31
Payer: MEDICARE

## 2023-05-31 PROCEDURE — 99024 POSTOP FOLLOW-UP VISIT: CPT

## 2023-05-31 NOTE — ASSESSMENT
[FreeTextEntry1] : Patient comes in for her post op. She is doing well. She stopped her antibiotics. She states the antibiotics were giving her more issues with her stomach. She has been working on range of motion. She is able to  a steering wheel. \par \par Right ring finger: Incision site clean and intact, sutures removed, decreased range of motion of finger however improved, minimal erythema, neurovascular intact\par \par The patient's sutures were removed today. The patient is doing well. She will work on scar massage.I spoke with the infectious disease doctor regarding stopping the Bactrim.  He felt that it was acceptable to stop that and to continue with the Augmentin.  The patient will do so.  She will follow up in 2 weeks.

## 2023-06-01 LAB
CULTURE RESULTS: SIGNIFICANT CHANGE UP
CULTURE RESULTS: SIGNIFICANT CHANGE UP
SPECIMEN SOURCE: SIGNIFICANT CHANGE UP
SPECIMEN SOURCE: SIGNIFICANT CHANGE UP

## 2023-06-20 ENCOUNTER — APPOINTMENT (OUTPATIENT)
Dept: ORTHOPEDIC SURGERY | Facility: CLINIC | Age: 73
End: 2023-06-20

## 2023-09-22 ENCOUNTER — APPOINTMENT (OUTPATIENT)
Dept: ORTHOPEDIC SURGERY | Facility: CLINIC | Age: 73
End: 2023-09-22
Payer: MEDICARE

## 2023-09-22 VITALS — WEIGHT: 140 LBS | BODY MASS INDEX: 23.9 KG/M2 | HEIGHT: 64 IN

## 2023-09-22 PROCEDURE — 73140 X-RAY EXAM OF FINGER(S): CPT | Mod: RT

## 2023-09-22 PROCEDURE — 99213 OFFICE O/P EST LOW 20 MIN: CPT

## 2023-09-28 ENCOUNTER — APPOINTMENT (OUTPATIENT)
Dept: MRI IMAGING | Facility: CLINIC | Age: 73
End: 2023-09-28
Payer: MEDICARE

## 2023-09-28 PROCEDURE — 73218 MRI UPPER EXTREMITY W/O DYE: CPT | Mod: RT,MH

## 2023-10-04 ENCOUNTER — APPOINTMENT (OUTPATIENT)
Dept: ORTHOPEDIC SURGERY | Facility: CLINIC | Age: 73
End: 2023-10-04
Payer: MEDICARE

## 2023-10-04 DIAGNOSIS — S61.451A OPEN BITE OF RIGHT HAND, INITIAL ENCOUNTER: ICD-10-CM
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
## 2023-10-04 PROCEDURE — 99214 OFFICE O/P EST MOD 30 MIN: CPT

## 2024-05-28 ENCOUNTER — OUTPATIENT (OUTPATIENT)
Dept: OUTPATIENT SERVICES | Facility: HOSPITAL | Age: 74
LOS: 1 days | End: 2024-05-28

## 2024-05-28 DIAGNOSIS — H25.89 OTHER AGE-RELATED CATARACT: ICD-10-CM

## 2024-05-28 DIAGNOSIS — Z98.891 HISTORY OF UTERINE SCAR FROM PREVIOUS SURGERY: Chronic | ICD-10-CM

## 2024-06-04 ENCOUNTER — APPOINTMENT (OUTPATIENT)
Dept: OPHTHALMOLOGY | Facility: CLINIC | Age: 74
End: 2024-06-04
Payer: MEDICARE

## 2024-06-04 ENCOUNTER — OUTPATIENT (OUTPATIENT)
Dept: OUTPATIENT SERVICES | Facility: HOSPITAL | Age: 74
LOS: 1 days | End: 2024-06-04
Payer: MEDICARE

## 2024-06-04 DIAGNOSIS — Z98.891 HISTORY OF UTERINE SCAR FROM PREVIOUS SURGERY: Chronic | ICD-10-CM

## 2024-06-04 DIAGNOSIS — H25.89 OTHER AGE-RELATED CATARACT: ICD-10-CM

## 2024-06-04 DIAGNOSIS — H25.13 AGE-RELATED NUCLEAR CATARACT, BILATERAL: ICD-10-CM

## 2024-06-04 PROCEDURE — 92136 OPHTHALMIC BIOMETRY: CPT | Mod: 26

## 2024-06-04 PROCEDURE — 92136 OPHTHALMIC BIOMETRY: CPT

## 2024-06-24 ENCOUNTER — OUTPATIENT (OUTPATIENT)
Dept: OUTPATIENT SERVICES | Facility: HOSPITAL | Age: 74
LOS: 1 days | Discharge: HOME | End: 2024-06-24
Payer: MEDICARE

## 2024-06-24 ENCOUNTER — TRANSCRIPTION ENCOUNTER (OUTPATIENT)
Age: 74
End: 2024-06-24

## 2024-06-24 VITALS — SYSTOLIC BLOOD PRESSURE: 121 MMHG | HEART RATE: 59 BPM | DIASTOLIC BLOOD PRESSURE: 67 MMHG | RESPIRATION RATE: 17 BRPM

## 2024-06-24 VITALS
OXYGEN SATURATION: 99 % | HEART RATE: 99 BPM | DIASTOLIC BLOOD PRESSURE: 82 MMHG | WEIGHT: 139.99 LBS | SYSTOLIC BLOOD PRESSURE: 150 MMHG | TEMPERATURE: 98 F | HEIGHT: 64 IN | RESPIRATION RATE: 17 BRPM

## 2024-06-24 DIAGNOSIS — H25.21 AGE-RELATED CATARACT, MORGAGNIAN TYPE, RIGHT EYE: ICD-10-CM

## 2024-06-24 DIAGNOSIS — Z98.891 HISTORY OF UTERINE SCAR FROM PREVIOUS SURGERY: Chronic | ICD-10-CM

## 2024-06-24 PROCEDURE — V2632: CPT

## 2024-06-24 PROCEDURE — V2787: CPT | Mod: GY

## 2024-06-24 RX ORDER — GABAPENTIN 400 MG/1
0 CAPSULE ORAL
Refills: 0 | DISCHARGE

## 2024-06-25 PROBLEM — G50.0 TRIGEMINAL NEURALGIA: Chronic | Status: ACTIVE | Noted: 2024-06-24

## 2024-06-25 PROBLEM — H26.9 UNSPECIFIED CATARACT: Chronic | Status: ACTIVE | Noted: 2024-06-24

## 2024-06-25 PROBLEM — H91.90 UNSPECIFIED HEARING LOSS, UNSPECIFIED EAR: Chronic | Status: ACTIVE | Noted: 2024-06-24

## 2024-06-25 PROBLEM — M19.90 UNSPECIFIED OSTEOARTHRITIS, UNSPECIFIED SITE: Chronic | Status: ACTIVE | Noted: 2024-06-24

## 2024-06-26 DIAGNOSIS — H52.201 UNSPECIFIED ASTIGMATISM, RIGHT EYE: ICD-10-CM

## 2024-06-26 DIAGNOSIS — H25.11 AGE-RELATED NUCLEAR CATARACT, RIGHT EYE: ICD-10-CM

## 2024-07-22 ENCOUNTER — OUTPATIENT (OUTPATIENT)
Dept: OUTPATIENT SERVICES | Facility: HOSPITAL | Age: 74
LOS: 1 days | Discharge: HOME | End: 2024-07-22
Payer: MEDICARE

## 2024-07-22 VITALS — RESPIRATION RATE: 17 BRPM | HEART RATE: 81 BPM | SYSTOLIC BLOOD PRESSURE: 103 MMHG | DIASTOLIC BLOOD PRESSURE: 58 MMHG

## 2024-07-22 VITALS
WEIGHT: 139.99 LBS | TEMPERATURE: 98 F | SYSTOLIC BLOOD PRESSURE: 154 MMHG | HEART RATE: 87 BPM | RESPIRATION RATE: 17 BRPM | OXYGEN SATURATION: 96 % | HEIGHT: 64 IN | DIASTOLIC BLOOD PRESSURE: 87 MMHG

## 2024-07-22 DIAGNOSIS — Z98.891 HISTORY OF UTERINE SCAR FROM PREVIOUS SURGERY: Chronic | ICD-10-CM

## 2024-07-22 DIAGNOSIS — Z98.49 CATARACT EXTRACTION STATUS, UNSPECIFIED EYE: Chronic | ICD-10-CM

## 2024-07-22 DIAGNOSIS — H25.22 AGE-RELATED CATARACT, MORGAGNIAN TYPE, LEFT EYE: ICD-10-CM

## 2024-07-22 PROCEDURE — V2632: CPT

## 2024-07-22 PROCEDURE — V2787: CPT | Mod: GY

## 2024-07-22 RX ORDER — ATORVASTATIN CALCIUM 20 MG/1
1 TABLET, FILM COATED ORAL
Refills: 0 | DISCHARGE

## 2024-07-22 NOTE — CHART NOTE - NSCHARTNOTEFT_GEN_A_CORE
PACU ANESTHESIA ADMISSION NOTE      Procedure: left eye cataract extraction    __x__  Patent Airway    _x___  Full return of protective reflexes    __x__  Full recovery from anesthesia / back to baseline       Mental Status:  ___x_ Awake   _____ Alert   _____ Drowsy   _____ Sedated    Nausea/Vomiting:  _x___ NO  ______Yes,   See Post - Op Orders          Pain Scale (0-10):  ___0__    Treatment: ____ None    ___x_ See Post - Op/PCA Orders    Post - Operative Fluids:   ____ Oral   __x__ See Post - Op Orders    Plan: Discharge:   ___x_Home       _____Floor     _____Critical Care    _____  Other:_________________    Comments:

## 2024-07-22 NOTE — PRE-ANESTHESIA EVALUATION ADULT - NSANTHOSAYNRD_GEN_A_CORE
No. ESTUARDO screening performed.  STOP BANG Legend: 0-2 = LOW Risk; 3-4 = INTERMEDIATE Risk; 5-8 = HIGH Risk

## 2024-07-22 NOTE — ASU PATIENT PROFILE, ADULT - NSICDXPASTMEDICALHX_GEN_ALL_CORE_FT
PAST MEDICAL HISTORY:  Cataract     [place] (hard of hearing)     Hypercholesteremia     OA (osteoarthritis)     Trigeminal neuralgia

## 2024-07-24 DIAGNOSIS — Z98.49 CATARACT EXTRACTION STATUS, UNSPECIFIED EYE: ICD-10-CM

## 2024-07-24 DIAGNOSIS — H25.12 AGE-RELATED NUCLEAR CATARACT, LEFT EYE: ICD-10-CM

## 2024-07-24 DIAGNOSIS — E78.00 PURE HYPERCHOLESTEROLEMIA, UNSPECIFIED: ICD-10-CM

## 2024-07-24 DIAGNOSIS — H91.90 UNSPECIFIED HEARING LOSS, UNSPECIFIED EAR: ICD-10-CM

## 2024-07-24 DIAGNOSIS — G50.0 TRIGEMINAL NEURALGIA: ICD-10-CM

## 2024-07-24 DIAGNOSIS — M19.90 UNSPECIFIED OSTEOARTHRITIS, UNSPECIFIED SITE: ICD-10-CM

## 2024-07-24 DIAGNOSIS — H52.202 UNSPECIFIED ASTIGMATISM, LEFT EYE: ICD-10-CM

## 2024-08-12 NOTE — DISCHARGE NOTE PROVIDER - NSDCQMPCI_CARD_ALL_CORE
Continue Regimen: Mometasone 0.1% solution as needed for flare ups
Detail Level: Zone
Initiate Treatment: ciclopirox 1 % shampoo Twice a week\\nQuantity: 120.0 ml\\nSig: Apply to scalp allowing to sit for 5-10 minutes, then rinse well. 2-3 times weekly
Render In Strict Bullet Format?: No
No

## 2024-08-17 DIAGNOSIS — M54.9 DORSALGIA, UNSPECIFIED: ICD-10-CM

## 2024-08-17 RX ORDER — DICLOFENAC SODIUM 1% 10 MG/G
1 GEL TOPICAL
Qty: 100 | Refills: 2 | Status: ACTIVE | COMMUNITY
Start: 2024-08-17 | End: 1900-01-01

## 2024-08-17 RX ORDER — DICLOFENAC SODIUM 75 MG/1
75 TABLET, DELAYED RELEASE ORAL
Qty: 60 | Refills: 1 | Status: ACTIVE | COMMUNITY
Start: 2024-08-17 | End: 1900-01-01

## 2024-08-17 RX ORDER — TIZANIDINE HYDROCHLORIDE 2 MG/1
2 CAPSULE ORAL
Qty: 30 | Refills: 0 | Status: ACTIVE | COMMUNITY
Start: 2024-08-17 | End: 1900-01-01

## 2024-08-29 ENCOUNTER — APPOINTMENT (OUTPATIENT)
Dept: ORTHOPEDIC SURGERY | Facility: CLINIC | Age: 74
End: 2024-08-29

## 2024-08-29 DIAGNOSIS — M54.9 DORSALGIA, UNSPECIFIED: ICD-10-CM

## 2024-08-29 PROCEDURE — 99213 OFFICE O/P EST LOW 20 MIN: CPT

## 2024-08-30 ENCOUNTER — APPOINTMENT (OUTPATIENT)
Dept: MRI IMAGING | Facility: CLINIC | Age: 74
End: 2024-08-30

## 2024-08-30 PROCEDURE — 72146 MRI CHEST SPINE W/O DYE: CPT | Mod: MH

## 2024-08-30 PROCEDURE — 72148 MRI LUMBAR SPINE W/O DYE: CPT | Mod: MH

## 2024-09-04 ENCOUNTER — APPOINTMENT (OUTPATIENT)
Dept: ORTHOPEDIC SURGERY | Facility: CLINIC | Age: 74
End: 2024-09-04
Payer: MEDICARE

## 2024-09-04 VITALS — BODY MASS INDEX: 23.9 KG/M2 | HEIGHT: 64 IN | WEIGHT: 140 LBS

## 2024-09-04 DIAGNOSIS — S22.080A WEDGE COMPRESSION FRACTURE OF T11-T12 VERTEBRA, INITIAL ENCOUNTER FOR CLOSED FRACTURE: ICD-10-CM

## 2024-09-04 PROCEDURE — 99213 OFFICE O/P EST LOW 20 MIN: CPT

## 2024-09-04 NOTE — DATA REVIEWED
[FreeTextEntry1] : I reviewed the patient's MRI of her lumbar spine and thoracic spine that was done at [address] [place] [address] on August 30, 2024.  The patient has a compression fracture acute at T12.

## 2024-09-04 NOTE — DISCUSSION/SUMMARY
[de-identified] : 73-year-old female with a T12 compression fracture.  I discussed with the patient that these typically improve with time.  I am recommending physical therapy continue bracing and anti-inflammatories as needed.  The patient has no signs or symptoms of any neurologic compromise.  No surgical intervention.  This is stable fracture pattern.  I briefly discussed kyphoplasty.  We also discussed bone health following up with an endocrinologist or PCP for diagnosis and management of osteoporosis.

## 2024-09-04 NOTE — IMAGING
[de-identified] : Tenderness to palpation over the lower thoracic spine as well as over the paraspinal musculature.

## 2024-09-04 NOTE — HISTORY OF PRESENT ILLNESS
[de-identified] : 73-year-old female presents with improving lower thoracic pain.  2 to 3 weeks ago the patient was horseback riding and [place].  She felt sudden pain when the horse jolted.  Initially the pain was severe and she was barely able to move.  However it significantly improving.  She does take anti-inflammatories.  She wears a brace as well occasionally.  No pain radiating down the legs.  No numbness and tingling.  No loss of bladder or bowel.

## 2024-09-11 ENCOUNTER — OUTPATIENT (OUTPATIENT)
Dept: OUTPATIENT SERVICES | Facility: HOSPITAL | Age: 74
LOS: 1 days | End: 2024-09-11
Payer: MEDICARE

## 2024-09-11 DIAGNOSIS — Z98.891 HISTORY OF UTERINE SCAR FROM PREVIOUS SURGERY: Chronic | ICD-10-CM

## 2024-09-11 DIAGNOSIS — Z12.31 ENCOUNTER FOR SCREENING MAMMOGRAM FOR MALIGNANT NEOPLASM OF BREAST: ICD-10-CM

## 2024-09-11 DIAGNOSIS — Z13.820 ENCOUNTER FOR SCREENING FOR OSTEOPOROSIS: ICD-10-CM

## 2024-09-11 DIAGNOSIS — Z98.49 CATARACT EXTRACTION STATUS, UNSPECIFIED EYE: Chronic | ICD-10-CM

## 2024-09-11 PROCEDURE — 77067 SCR MAMMO BI INCL CAD: CPT

## 2024-09-11 PROCEDURE — 77067 SCR MAMMO BI INCL CAD: CPT | Mod: 26

## 2024-09-11 PROCEDURE — 77080 DXA BONE DENSITY AXIAL: CPT | Mod: 26

## 2024-09-11 PROCEDURE — 77063 BREAST TOMOSYNTHESIS BI: CPT

## 2024-09-11 PROCEDURE — 77080 DXA BONE DENSITY AXIAL: CPT

## 2024-09-11 PROCEDURE — 77063 BREAST TOMOSYNTHESIS BI: CPT | Mod: 26

## 2024-09-12 DIAGNOSIS — Z12.31 ENCOUNTER FOR SCREENING MAMMOGRAM FOR MALIGNANT NEOPLASM OF BREAST: ICD-10-CM

## 2024-09-12 DIAGNOSIS — Z13.820 ENCOUNTER FOR SCREENING FOR OSTEOPOROSIS: ICD-10-CM

## 2024-09-16 DIAGNOSIS — M81.0 AGE-RELATED OSTEOPOROSIS WITHOUT CURRENT PATHOLOGICAL FRACTURE: ICD-10-CM

## 2024-09-18 ENCOUNTER — APPOINTMENT (OUTPATIENT)
Facility: CLINIC | Age: 74
End: 2024-09-18